# Patient Record
Sex: FEMALE | Race: ASIAN | NOT HISPANIC OR LATINO | URBAN - METROPOLITAN AREA
[De-identification: names, ages, dates, MRNs, and addresses within clinical notes are randomized per-mention and may not be internally consistent; named-entity substitution may affect disease eponyms.]

---

## 2017-08-23 ENCOUNTER — IMPORTED ENCOUNTER (OUTPATIENT)
Dept: URBAN - METROPOLITAN AREA CLINIC 1 | Facility: CLINIC | Age: 62
End: 2017-08-23

## 2017-08-23 PROBLEM — H04.123: Noted: 2017-08-23

## 2017-08-23 PROBLEM — E11.9: Noted: 2017-08-23

## 2017-08-23 PROBLEM — H25.813: Noted: 2017-08-23

## 2017-08-23 PROBLEM — Z79.84: Noted: 2017-08-23

## 2017-08-23 PROCEDURE — 92004 COMPRE OPH EXAM NEW PT 1/>: CPT

## 2017-08-23 PROCEDURE — 83861 MICROFLUID ANALY TEARS: CPT

## 2017-08-23 NOTE — PATIENT DISCUSSION
1.  DM Type II without sign of diabetic retinopathy and no blot heme on dilated retinal examination today OU No Macular Edema:  Discussed the pathophysiology of diabetes and its effect on the eye and risk of blindness. Stressed the importance of strong glucose control. Advised of importance of at least yearly dilated examinations but to contact us immediately for any problems or concerns. 2. Type II diabetes controlled by oral medications. 3.  Cataract OU: Observe for now without intervention. The patient was advised to contact us if any change or worsening of vision4. Dry Eyes OU -- Tear lab was done today results was 300 OU. Recommended to patient to use Artificial Tears TID OU-Gave patient a sample of Systane Balance. Will consider Xiidra or Restasis 5. Return for an appointment in 1 month for 10. with Dr. Johnson.

## 2017-09-20 ENCOUNTER — IMPORTED ENCOUNTER (OUTPATIENT)
Dept: URBAN - METROPOLITAN AREA CLINIC 1 | Facility: CLINIC | Age: 62
End: 2017-09-20

## 2017-09-20 PROBLEM — H04.123: Noted: 2017-09-20

## 2017-09-20 PROCEDURE — 99213 OFFICE O/P EST LOW 20 MIN: CPT

## 2017-09-20 NOTE — PATIENT DISCUSSION
1.  Dry Eyes OU -- Cont using  Artificial Tears QID OU. Consider Restasis PRN OU 2. Return for an appointment in 6 months for 10. with Dr. Brad Howell.

## 2018-03-20 ENCOUNTER — IMPORTED ENCOUNTER (OUTPATIENT)
Dept: URBAN - METROPOLITAN AREA CLINIC 1 | Facility: CLINIC | Age: 63
End: 2018-03-20

## 2018-03-20 PROBLEM — H16.143: Noted: 2018-03-20

## 2018-03-20 PROBLEM — H04.123: Noted: 2018-03-20

## 2018-03-20 PROCEDURE — 99213 OFFICE O/P EST LOW 20 MIN: CPT

## 2018-03-20 NOTE — PATIENT DISCUSSION
1.  AVA w/ PEK OU- Recommend increase ATs TID-QID OU routinely 2. Cataract OU 3. H/o DM w/o DR MEYER Return for an appointment in 6 months 30/tear lab with Dr. Carolyn Paulson.

## 2019-04-11 ENCOUNTER — IMPORTED ENCOUNTER (OUTPATIENT)
Dept: URBAN - METROPOLITAN AREA CLINIC 1 | Facility: CLINIC | Age: 64
End: 2019-04-11

## 2019-04-11 PROBLEM — H25.813: Noted: 2019-04-11

## 2019-04-11 PROBLEM — Z79.84: Noted: 2019-04-11

## 2019-04-11 PROBLEM — H04.123: Noted: 2019-04-11

## 2019-04-11 PROBLEM — E11.9: Noted: 2019-04-11

## 2019-04-11 PROCEDURE — 83861 MICROFLUID ANALY TEARS: CPT

## 2019-04-11 PROCEDURE — 92014 COMPRE OPH EXAM EST PT 1/>: CPT

## 2019-04-11 NOTE — PATIENT DISCUSSION
1.  DM Type II without sign of diabetic retinopathy and no blot heme on dilated retinal examination today OU No Macular Edema:  Discussed the pathophysiology of diabetes and its effect on the eye and risk of blindness. Stressed the importance of strong glucose control. Advised of importance of at least yearly dilated examinations but to contact us immediately for any problems or concerns. 2. Type II diabetes controlled by oral medications. 3.  Dry Eyes OU --Tear lab was done results was 354 OD and 367 OS. REC patient to cont using AT and start using Xiidra BID OU (Erx today). 4.  Cataract OU: Observe for now without intervention. The patient was advised to contact us if any change or worsening of vision5. Return for an appointment in 1 month for 10. with Dr. Obie Mcmahon.

## 2019-04-11 NOTE — PATIENT DISCUSSION
Dry Eyes OU --Tear lab was done results was 354 OD and 367 OS. REC patient to cont using AT and start using Xiidra BID OU.

## 2019-05-17 ENCOUNTER — IMPORTED ENCOUNTER (OUTPATIENT)
Dept: URBAN - METROPOLITAN AREA CLINIC 1 | Facility: CLINIC | Age: 64
End: 2019-05-17

## 2019-05-17 PROBLEM — H16.143: Noted: 2019-05-17

## 2019-05-17 PROBLEM — H04.123: Noted: 2019-05-17

## 2019-05-17 PROCEDURE — 99213 OFFICE O/P EST LOW 20 MIN: CPT

## 2019-05-17 NOTE — PATIENT DISCUSSION
1.  AVA w/ PEK OU -- Tear Lab Results: OD- 354 / OS- 367 (4/11/19). Recommend continue the frequent use of OTC AT's QID OU Routinely. Continue Xiidra BID OU.2. Cataracts OU -- Observe for now without intervention. The patient was advised to contact us if any change or worsening of vision. 3. Arcus OU 4. H/o DM Type II (Oral Meds) w/o DR / DME OUReturn for an appointment in 6 MO for a 10 (Dry Eye / K Check OU) with Dr. Spann Coletn.

## 2019-11-20 ENCOUNTER — IMPORTED ENCOUNTER (OUTPATIENT)
Dept: URBAN - METROPOLITAN AREA CLINIC 1 | Facility: CLINIC | Age: 64
End: 2019-11-20

## 2019-11-20 PROBLEM — H04.123: Noted: 2019-11-20

## 2019-11-20 PROBLEM — H16.143: Noted: 2019-11-20

## 2019-11-20 PROCEDURE — 92012 INTRM OPH EXAM EST PATIENT: CPT

## 2019-11-20 NOTE — PATIENT DISCUSSION
1.  AVA w/ increased PEK OU -- Tear Lab Results: OD- 354 / OS- 367 (4/11/19)H/o Xiidra Intolerance. Ok to remain off Dinorah Valle. Begin  Restasis BID OU (Rx sent to patient's pharmacy). Cont AT's QID OU Routinely. 2.  Cataracts OU -- Observe. 3.  Arcus OU 4. H/o DM Type II (Oral Meds) w/o DR / DME Inderjit Gamez for an appointment in 2 mo 10 (Check K on Restasis) with Dr. Brooklynn Scales.

## 2020-01-09 ENCOUNTER — IMPORTED ENCOUNTER (OUTPATIENT)
Dept: URBAN - METROPOLITAN AREA CLINIC 1 | Facility: CLINIC | Age: 65
End: 2020-01-09

## 2020-01-09 PROBLEM — H04.123: Noted: 2020-01-09

## 2020-01-09 PROBLEM — H16.143: Noted: 2020-01-09

## 2020-01-09 PROCEDURE — 92012 INTRM OPH EXAM EST PATIENT: CPT

## 2020-01-09 NOTE — PATIENT DISCUSSION
1.  AVA w/ increased PEK OU -- PEK and Symptoms improved on Restasis. Continue Restasis BID OU. Cont AT's QID OU Routinely. Tear Lab Results: OD- 354 / OS- 367 (4/11/19) H/o Xiidra Intolerance. 2. Cataracts OU -- Observe. Patient c/o some glare. Will perform Glare at next visit. 3. Arcus OU 4. H/o DM Type II (Oral Meds) w/o DR / DME OU. Return for an appointment in May for a 30/Mrx/glare with Dr. Kathryn Zimmer.

## 2021-01-21 ENCOUNTER — IMPORTED ENCOUNTER (OUTPATIENT)
Dept: URBAN - METROPOLITAN AREA CLINIC 1 | Facility: CLINIC | Age: 66
End: 2021-01-21

## 2021-01-21 PROBLEM — Z79.84: Noted: 2021-01-21

## 2021-01-21 PROBLEM — E11.9: Noted: 2021-01-21

## 2021-01-21 PROBLEM — H04.123: Noted: 2021-01-21

## 2021-01-21 PROBLEM — H25.813: Noted: 2021-01-21

## 2021-01-21 PROBLEM — H16.143: Noted: 2021-01-21

## 2021-01-21 PROBLEM — H43.811: Noted: 2021-01-21

## 2021-01-21 PROCEDURE — 99214 OFFICE O/P EST MOD 30 MIN: CPT

## 2021-01-21 NOTE — PATIENT DISCUSSION
1.  DM Type II (Oral Meds) without sign of diabetic retinopathy and no blot heme on dilated retinal examination today OU No Macular Edema:  Discussed the pathophysiology of diabetes and its effect on the eye and risk of blindness. Stressed the importance of strong glucose control. Advised of importance of at least yearly dilated examinations but to contact us immediately for any problems or concerns. 2. AVA w/ PEK OU - Pt states no longer using Restasis secondary to cost with old insurance. Will try to send again with new insurance. Restart Restasis BID OU (erx'd). Recommend ATs TID-QID OU routinely (use QID OU if unable to obtain Restasis). H/o Xiidra Intolerance. 3. Cataract OU: Observe for now without intervention. The patient was advised to contact us if any change or worsening of vision4. PVD w/o Tear OD - *NEW*. Patient was cautioned to call our office immediately if they experience   a substantial change in their symptoms such as an increase in floaters persistent flashes loss of visual field (may appear as a shadow or a curtain) or decrease in visual acuity as these may indicate a retinal tear or detachment. 5.  Arcus OU - Stable. Patient deferred Manifest Rx today. Return for an appointment in 6 months 10/k check with Dr. Evelyn Tena.

## 2021-07-20 ENCOUNTER — IMPORTED ENCOUNTER (OUTPATIENT)
Dept: URBAN - METROPOLITAN AREA CLINIC 1 | Facility: CLINIC | Age: 66
End: 2021-07-20

## 2021-07-20 PROBLEM — H16.143: Noted: 2021-07-20

## 2021-07-20 PROBLEM — H04.123: Noted: 2021-07-20

## 2021-07-20 PROCEDURE — 99213 OFFICE O/P EST LOW 20 MIN: CPT

## 2021-07-20 NOTE — PATIENT DISCUSSION
1.  AVA w/ PEK OU -- Slight improvement despite pt states still not Restasis secondary to cost with new insurance. Recommend increasing ATs to TID-QID OU routinely. H/o Xiidra Intolerance. 2. Cataract OU -- Observe for now without intervention. The patient was advised to contact us if any change or worsening of vision3. PVD w/o Tear OD 4. Arcus OU -- Stable. 5.  DM Type II (Oral Meds) -- not evaluated today. Return for an appointment in 6 month 27 with Dr. Drew Peck.

## 2022-02-28 ENCOUNTER — COMPREHENSIVE EXAM (OUTPATIENT)
Dept: URBAN - METROPOLITAN AREA CLINIC 1 | Facility: CLINIC | Age: 67
End: 2022-02-28

## 2022-02-28 DIAGNOSIS — E11.9: ICD-10-CM

## 2022-02-28 PROCEDURE — 92014 COMPRE OPH EXAM EST PT 1/>: CPT

## 2022-02-28 ASSESSMENT — VISUAL ACUITY
OD_SC: 20/20
OS_BAT: 20/50
OD_BAT: 20/50
OS_SC: 20/20

## 2022-02-28 ASSESSMENT — TONOMETRY
OS_IOP_MMHG: 15
OD_IOP_MMHG: 15

## 2022-04-02 ASSESSMENT — VISUAL ACUITY
OD_CC: 20/20
OS_CC: 20/20
OD_CC: 20/20
OD_CC: 20/20
OD_GLARE: 20/30
OS_CC: 20/20
OS_CC: 20/25
OD_CC: 20/20
OD_SC: J6
OS_CC: 20/25
OD_CC: 20/20-2
OS_SC: J6
OS_GLARE: 20/30
OD_CC: 20/20-1
OD_CC: 20/20
OD_CC: 20/25
OS_CC: 20/20
OS_CC: 20/25
OS_CC: 20/25-1
OS_CC: 20/20
OD_CC: 20/20
OS_CC: 20/25

## 2022-04-02 ASSESSMENT — TONOMETRY
OS_IOP_MMHG: 16
OD_IOP_MMHG: 16
OS_IOP_MMHG: 16
OS_IOP_MMHG: 15
OD_IOP_MMHG: 17
OS_IOP_MMHG: 16
OD_IOP_MMHG: 15
OD_IOP_MMHG: 16
OD_IOP_MMHG: 14
OS_IOP_MMHG: 15
OS_IOP_MMHG: 17
OS_IOP_MMHG: 14
OD_IOP_MMHG: 16
OD_IOP_MMHG: 14
OD_IOP_MMHG: 14
OS_IOP_MMHG: 15
OD_IOP_MMHG: 15
OS_IOP_MMHG: 14

## 2022-04-02 ASSESSMENT — KERATOMETRY
OD_K1POWER_DIOPTERS: 42.25
OD_AXISANGLE2_DEGREES: 093
OS_K1POWER_DIOPTERS: 42.25
OD_AXISANGLE_DEGREES: 003
OS_AXISANGLE_DEGREES: 180
OS_AXISANGLE2_DEGREES: 090
OD_K2POWER_DIOPTERS: 42.75
OS_K2POWER_DIOPTERS: 43.00

## 2023-04-28 ENCOUNTER — PRE-OP/H&P (OUTPATIENT)
Dept: URBAN - METROPOLITAN AREA CLINIC 1 | Facility: CLINIC | Age: 68
End: 2023-04-28

## 2023-04-28 VITALS
HEART RATE: 85 BPM | DIASTOLIC BLOOD PRESSURE: 82 MMHG | SYSTOLIC BLOOD PRESSURE: 132 MMHG | BODY MASS INDEX: 25.52 KG/M2 | HEIGHT: 60 IN | WEIGHT: 130 LBS

## 2023-04-28 DIAGNOSIS — H25.813: ICD-10-CM

## 2023-04-28 PROCEDURE — 92136 OPHTHALMIC BIOMETRY: CPT

## 2023-04-28 PROCEDURE — 92012 INTRM OPH EXAM EST PATIENT: CPT

## 2023-04-28 PROCEDURE — 92025 CPTRIZED CORNEAL TOPOGRAPHY: CPT

## 2023-04-28 ASSESSMENT — VISUAL ACUITY
OD_SC: 20/20
OS_SC: 20/20
OS_BAT: 20/60
OD_BAT: 20/60

## 2023-05-10 ENCOUNTER — SURGERY/PROCEDURE (OUTPATIENT)
Dept: URBAN - METROPOLITAN AREA SURGERY 1 | Facility: SURGERY | Age: 68
End: 2023-05-10

## 2023-05-10 DIAGNOSIS — H25.812: ICD-10-CM

## 2023-05-10 PROCEDURE — 66984 XCAPSL CTRC RMVL W/O ECP: CPT

## 2023-05-11 ENCOUNTER — POST-OP (OUTPATIENT)
Dept: URBAN - METROPOLITAN AREA CLINIC 1 | Facility: CLINIC | Age: 68
End: 2023-05-11

## 2023-05-11 DIAGNOSIS — Z96.1: ICD-10-CM

## 2023-05-11 PROCEDURE — 99024 POSTOP FOLLOW-UP VISIT: CPT

## 2023-05-11 ASSESSMENT — TONOMETRY: OS_IOP_MMHG: 20

## 2023-05-11 ASSESSMENT — VISUAL ACUITY: OS_SC: 20/20

## 2023-05-16 ENCOUNTER — POST OP/EVAL OF SECOND EYE (OUTPATIENT)
Dept: URBAN - METROPOLITAN AREA CLINIC 1 | Facility: CLINIC | Age: 68
End: 2023-05-16

## 2023-05-16 DIAGNOSIS — H25.811: ICD-10-CM

## 2023-05-16 PROCEDURE — 99024 POSTOP FOLLOW-UP VISIT: CPT

## 2023-05-16 PROCEDURE — 92136 OPHTHALMIC BIOMETRY: CPT

## 2023-05-16 PROCEDURE — 92025 CPTRIZED CORNEAL TOPOGRAPHY: CPT

## 2023-05-16 ASSESSMENT — VISUAL ACUITY
OS_SC: 20/25+1
OD_SC: 20/25
OD_BAT: 20/60

## 2023-05-16 ASSESSMENT — TONOMETRY
OD_IOP_MMHG: 16
OS_IOP_MMHG: 17

## 2023-05-24 ENCOUNTER — SURGERY/PROCEDURE (OUTPATIENT)
Dept: URBAN - METROPOLITAN AREA SURGERY 1 | Facility: SURGERY | Age: 68
End: 2023-05-24

## 2023-05-24 DIAGNOSIS — H25.811: ICD-10-CM

## 2023-05-24 PROCEDURE — 66984 XCAPSL CTRC RMVL W/O ECP: CPT

## 2023-05-25 ENCOUNTER — POST-OP (OUTPATIENT)
Dept: URBAN - METROPOLITAN AREA CLINIC 1 | Facility: CLINIC | Age: 68
End: 2023-05-25

## 2023-05-25 DIAGNOSIS — Z96.1: ICD-10-CM

## 2023-05-25 PROCEDURE — 99024 POSTOP FOLLOW-UP VISIT: CPT

## 2023-05-25 ASSESSMENT — TONOMETRY
OD_IOP_MMHG: 12
OS_IOP_MMHG: 14

## 2023-05-25 ASSESSMENT — VISUAL ACUITY
OS_SC: J3
OD_SC: 20/25
OS_SC: 20/25
OD_SC: J3

## 2023-06-15 ENCOUNTER — POST-OP (OUTPATIENT)
Dept: URBAN - METROPOLITAN AREA CLINIC 1 | Facility: CLINIC | Age: 68
End: 2023-06-15

## 2023-06-15 DIAGNOSIS — Z96.1: ICD-10-CM

## 2023-06-15 PROCEDURE — 99024 POSTOP FOLLOW-UP VISIT: CPT

## 2023-06-15 ASSESSMENT — TONOMETRY
OS_IOP_MMHG: 16
OD_IOP_MMHG: 14

## 2023-06-15 ASSESSMENT — VISUAL ACUITY
OD_SC: 20/20
OU_SC: J1
OS_SC: 20/20

## 2023-09-18 ENCOUNTER — FOLLOW UP (OUTPATIENT)
Dept: URBAN - METROPOLITAN AREA CLINIC 1 | Facility: CLINIC | Age: 68
End: 2023-09-18

## 2023-09-18 DIAGNOSIS — H16.143: ICD-10-CM

## 2023-09-18 DIAGNOSIS — H04.123: ICD-10-CM

## 2023-09-18 PROCEDURE — 99213 OFFICE O/P EST LOW 20 MIN: CPT

## 2023-09-18 ASSESSMENT — TONOMETRY
OS_IOP_MMHG: 14
OD_IOP_MMHG: 14

## 2023-09-18 ASSESSMENT — VISUAL ACUITY
OD_SC: 20/20
OS_SC: J2
OS_SC: 20/20
OD_SC: J2

## 2024-06-07 ENCOUNTER — COMPREHENSIVE EXAM (OUTPATIENT)
Dept: URBAN - METROPOLITAN AREA CLINIC 1 | Facility: CLINIC | Age: 69
End: 2024-06-07

## 2024-06-07 DIAGNOSIS — H04.123: ICD-10-CM

## 2024-06-07 DIAGNOSIS — H16.143: ICD-10-CM

## 2024-06-07 DIAGNOSIS — H43.811: ICD-10-CM

## 2024-06-07 DIAGNOSIS — Z96.1: ICD-10-CM

## 2024-06-07 DIAGNOSIS — E11.9: ICD-10-CM

## 2024-06-07 PROCEDURE — 99214 OFFICE O/P EST MOD 30 MIN: CPT

## 2024-06-07 PROCEDURE — 92015 DETERMINE REFRACTIVE STATE: CPT

## 2024-06-07 ASSESSMENT — VISUAL ACUITY
OD_SC: 20/25+1
OD_SC: J2
OS_SC: J2
OS_SC: 20/20-2

## 2024-06-07 ASSESSMENT — TONOMETRY
OD_IOP_MMHG: 12
OS_IOP_MMHG: 13

## 2025-06-09 ENCOUNTER — COMPREHENSIVE EXAM (OUTPATIENT)
Age: 70
End: 2025-06-09

## 2025-06-09 DIAGNOSIS — H26.493: ICD-10-CM

## 2025-06-09 DIAGNOSIS — H16.143: ICD-10-CM

## 2025-06-09 DIAGNOSIS — H04.123: ICD-10-CM

## 2025-06-09 DIAGNOSIS — H18.413: ICD-10-CM

## 2025-06-09 DIAGNOSIS — Z96.1: ICD-10-CM

## 2025-06-09 DIAGNOSIS — H43.811: ICD-10-CM

## 2025-06-09 DIAGNOSIS — E11.9: ICD-10-CM

## 2025-06-09 PROCEDURE — 99214 OFFICE O/P EST MOD 30 MIN: CPT

## 2025-06-09 PROCEDURE — 92015 DETERMINE REFRACTIVE STATE: CPT
